# Patient Record
Sex: FEMALE | Race: WHITE | NOT HISPANIC OR LATINO | Employment: OTHER
[De-identification: names, ages, dates, MRNs, and addresses within clinical notes are randomized per-mention and may not be internally consistent; named-entity substitution may affect disease eponyms.]

---

## 2019-11-21 ENCOUNTER — RECORDS - HEALTHEAST (OUTPATIENT)
Dept: ADMINISTRATIVE | Facility: OTHER | Age: 84
End: 2019-11-21

## 2020-01-03 ENCOUNTER — HOSPITAL ENCOUNTER (OUTPATIENT)
Dept: CARDIOLOGY | Facility: HOSPITAL | Age: 85
Discharge: HOME | End: 2020-01-03
Attending: PSYCHIATRY & NEUROLOGY

## 2020-01-03 ENCOUNTER — HOSPITAL ENCOUNTER (OUTPATIENT)
Dept: CT IMAGING | Facility: HOSPITAL | Age: 85
Discharge: HOME | End: 2020-01-03
Attending: PSYCHIATRY & NEUROLOGY

## 2020-01-03 DIAGNOSIS — G45.1 TIA INVOLVING CAROTID ARTERY: ICD-10-CM

## 2020-01-03 LAB
AORTIC ROOT: 2.9 CM
AORTIC VALVE MEAN VELOCITY: 169 CM/S
ASCENDING AORTA: 3.3 CM
AV DIMENSIONLESS INDEX VTI: 0.3
AV MEAN GRADIENT: 14 MMHG
AV PEAK GRADIENT: 23.8 MMHG
AV VALVE AREA: 1.1 CM2
BSA FOR ECHO PROCEDURE: 1.71 M2
CREAT BLD-MCNC: 1.3 MG/DL (ref 0.6–1.1)
CV BLOOD PRESSURE: ABNORMAL MMHG
CV ECHO HEIGHT: 61 IN
CV ECHO WEIGHT: 150 LBS
DOP CALC AO PEAK VEL: 244 CM/S
DOP CALC AO VTI: 59.5 CM
DOP CALC LVOT AREA: 3.46 CM2
DOP CALC LVOT DIAMETER: 2.1 CM
DOP CALC LVOT STROKE VOLUME: 66.8 CM3
DOP CALCLVOT PEAK VEL VTI: 19.3 CM
EJECTION FRACTION: 58 % (ref 55–75)
FRACTIONAL SHORTENING: 31.6 % (ref 28–44)
GFR SERPL CREATININE-BSD FRML MDRD: 38 ML/MIN/1.73M2
INTERVENTRICULAR SEPTUM IN END DIASTOLE: 1.1 CM (ref 0.6–0.9)
IVS/PW RATIO: 1.6
LA AREA 1: 21.5 CM2
LA AREA 2: 17.7 CM2
LEFT ATRIUM LENGTH: 5.27 CM
LEFT ATRIUM SIZE: 3.7 CM
LEFT ATRIUM TO AORTIC ROOT RATIO: 1.28 NO UNITS
LEFT ATRIUM VOLUME INDEX: 35.9 ML/M2
LEFT ATRIUM VOLUME: 61.4 ML
LEFT VENTRICLE DIASTOLIC VOLUME INDEX: 29.2 CM3/M2 (ref 29–61)
LEFT VENTRICLE DIASTOLIC VOLUME: 50 CM3 (ref 46–106)
LEFT VENTRICLE MASS INDEX: 59.1 G/M2
LEFT VENTRICLE SYSTOLIC VOLUME INDEX: 12.3 CM3/M2 (ref 8–24)
LEFT VENTRICLE SYSTOLIC VOLUME: 21 CM3 (ref 14–42)
LEFT VENTRICULAR INTERNAL DIMENSION IN DIASTOLE: 3.8 CM (ref 3.8–5.2)
LEFT VENTRICULAR INTERNAL DIMENSION IN SYSTOLE: 2.6 CM (ref 2.2–3.5)
LEFT VENTRICULAR MASS: 101.1 G
LEFT VENTRICULAR OUTFLOW TRACT MEAN GRADIENT: 2 MMHG
LEFT VENTRICULAR OUTFLOW TRACT MEAN VELOCITY: 57.9 CM/S
LEFT VENTRICULAR POSTERIOR WALL IN END DIASTOLE: 0.7 CM (ref 0.6–0.9)
LV STROKE VOLUME INDEX: 39.1 ML/M2
MV AVERAGE E/E' RATIO: 12.8 CM/S
MV DECELERATION TIME: 338 MS
MV E'TISSUE VEL-LAT: 8.58 CM/S
MV E'TISSUE VEL-MED: 7.21 CM/S
MV LATERAL E/E' RATIO: 11.8
MV MEDIAL E/E' RATIO: 14
MV PEAK E VELOCITY: 101 CM/S
NUC REST DIASTOLIC VOLUME INDEX: 2400 LBS
NUC REST SYSTOLIC VOLUME INDEX: 61 IN
TRICUSPID REGURGITATION PEAK PRESSURE GRADIENT: 24.6 MMHG
TRICUSPID VALVE ANULAR PLANE SYSTOLIC EXCURSION: 1.4 CM
TRICUSPID VALVE PEAK REGURGITANT VELOCITY: 248 CM/S

## 2020-01-03 ASSESSMENT — MIFFLIN-ST. JEOR: SCORE: 1007.78

## 2020-11-05 DIAGNOSIS — E78.5 HYPERLIPIDEMIA, UNSPECIFIED HYPERLIPIDEMIA TYPE: Primary | ICD-10-CM

## 2020-11-05 PROBLEM — I48.91 ATRIAL FIBRILLATION (H): Status: ACTIVE | Noted: 2020-11-05

## 2020-11-05 PROBLEM — I10 HYPERTENSION: Status: ACTIVE | Noted: 2020-11-05

## 2020-11-05 PROBLEM — I25.10 ARTERIOSCLEROSIS OF CORONARY ARTERY: Status: ACTIVE | Noted: 2020-11-05

## 2020-11-05 PROBLEM — N18.30 STAGE 3 CHRONIC KIDNEY DISEASE (H): Status: ACTIVE | Noted: 2020-11-05

## 2020-11-05 PROBLEM — I65.29 INTERNAL CAROTID ARTERY STENOSIS: Status: ACTIVE | Noted: 2020-11-05

## 2020-11-05 PROBLEM — G45.1 TIA INVOLVING CAROTID ARTERY: Status: ACTIVE | Noted: 2020-01-13

## 2020-11-05 RX ORDER — PRAVASTATIN SODIUM 10 MG
10 TABLET ORAL DAILY
COMMUNITY
Start: 2019-11-21 | End: 2020-11-09

## 2020-11-05 NOTE — TELEPHONE ENCOUNTER
Refill request for pravastatin  Medication T'd for review and signature  Lavonne Gould CMA on 11/5/2020 at 3:38 PM

## 2020-11-09 RX ORDER — PRAVASTATIN SODIUM 10 MG
10 TABLET ORAL DAILY
Qty: 90 TABLET | Refills: 0 | Status: SHIPPED | OUTPATIENT
Start: 2020-11-09 | End: 2021-02-07

## 2021-06-04 VITALS — WEIGHT: 150 LBS | BODY MASS INDEX: 28.32 KG/M2 | HEIGHT: 61 IN

## 2022-01-01 ENCOUNTER — HOSPITAL ENCOUNTER (EMERGENCY)
Facility: HOSPITAL | Age: 87
Discharge: HOME | End: 2022-11-06
Attending: EMERGENCY MEDICINE | Admitting: EMERGENCY MEDICINE
Payer: COMMERCIAL

## 2022-01-01 ENCOUNTER — LAB REQUISITION (OUTPATIENT)
Dept: LAB | Facility: CLINIC | Age: 87
End: 2022-01-01
Payer: COMMERCIAL

## 2022-01-01 VITALS
TEMPERATURE: 97.8 F | OXYGEN SATURATION: 97 % | SYSTOLIC BLOOD PRESSURE: 132 MMHG | RESPIRATION RATE: 16 BRPM | HEART RATE: 94 BPM | HEIGHT: 61 IN | DIASTOLIC BLOOD PRESSURE: 79 MMHG | WEIGHT: 147 LBS | BODY MASS INDEX: 27.75 KG/M2

## 2022-01-01 DIAGNOSIS — R79.89 OTHER SPECIFIED ABNORMAL FINDINGS OF BLOOD CHEMISTRY: ICD-10-CM

## 2022-01-01 DIAGNOSIS — S51.811A SKIN TEAR OF FOREARM WITHOUT COMPLICATION, RIGHT, INITIAL ENCOUNTER: ICD-10-CM

## 2022-01-01 PROCEDURE — 99283 EMERGENCY DEPT VISIT LOW MDM: CPT

## 2022-01-01 PROCEDURE — 12002 RPR S/N/AX/GEN/TRNK2.6-7.5CM: CPT

## 2022-01-01 PROCEDURE — 99282 EMERGENCY DEPT VISIT SF MDM: CPT

## 2022-01-01 ASSESSMENT — ACTIVITIES OF DAILY LIVING (ADL): ADLS_ACUITY_SCORE: 35

## 2022-11-06 NOTE — ED PROVIDER NOTES
"EMERGENCY DEPARTMENT NOTE     Name: Payal Bartlett    Age/Sex: 89 year old female   MRN: [id number]   Evaluation Date & Time:  11/6/2022  4:48 PM    PCP:    Jimmy Pringle   ED Provider: Isauro Han D.O.       CHIEF COMPLAINT    Laceration       DIAGNOSIS & DISPOSITION     1. Skin tear of forearm without complication, right, initial encounter      DISPOSITION: Home    At the conclusion of the encounter I discussed the results of all of the tests and the disposition. The questions were answered. The patient or family acknowledged understanding and was agreeable with the care plan.    TOTAL CRITICAL CARE TIME (EXCLUDING PROCEDURES): Not applicable    PROCEDURES:     PROCEDURE: Laceration Repair   INDICATIONS: Laceration   PROCEDURE PROVIDER: Dr Isauro Han   SITE: Right forearm   TYPE/SIZE: simple, clean and no foreign body visualized  3 cm (total length)   FUNCTIONAL ASSESSMENT: Distal sensation, circulation and motor intact   PREPARATION: scrubbing with Normal saline   DEBRIDEMENT: no debridement   CLOSURE:  Superficial layer closed with Dermabond (medical glue)       EMERGENCY DEPARTMENT COURSE/MEDICAL DECISION MAKING   5:19 PM I met with the patient to gather history and to perform my initial exam.  We discussed treatment options and the plan for care while in the Emergency Department.    Payal Bartlett is a 89 year old female with relevant past history of HLD, HTN, atrial fibrillation, CAD, who presents to the emergency department for evaluation of right forearm skin tear.  Triage note reviewed:Pt does take warfarin.     Pt states she was shutting door at home and bumped into the wall and hit right forearm on the beam next to door. Pt denies any other injuries. Last tdap 2011.   Vital signs:/79 (BP Location: Left arm)   Pulse 94   Temp 97.8  F (36.6  C) (Temporal)   Resp 16   Ht 1.549 m (5' 1\")   Wt 66.7 kg (147 lb)   SpO2 97%   BMI 27.78 kg/m    Pertinent physical exam " findings:    Diagnostic studies:  Imaging:  No orders to display     Lab:  Labs Ordered and Resulted from Time of ED Arrival to Time of ED Departure - No data to display  Interventions: Laceration repair as per procedure note  Medical decision making: Patient did not wish to have her tetanus immunization updated today.  Patient will be discharged.  Monitor the wound.  No signs of infection redness swelling or drainage will return to the emergency department.  Medical Decision Making    Supplemental history from: N/A    External Record(s) Reviewed: Outpatient Record    Differential Diagnosis: See MDM charting for differential considered.     I performed an independent interpretation of the: N/A    Discussed with radiology regarding test interpretation: N/A    Discussion of management with another provider: See ED Course and N/A    The following testing was considered but ultimately not selected: None    I considered prescription management with: N/A    The patient's care impacted: None    Consideration of Admission/Observation: N/A - Patient discharged without consideration for admission    Care significantly affected by Social Determinants of Health including: N/A  ED INTERVENTIONS   Medications - No data to display    DISCHARGE MEDICATIONS        Review of your medicines      UNREVIEWED medicines. Ask your doctor about these medicines      Dose / Directions   pravastatin 10 MG tablet  Commonly known as: PRAVACHOL  Used for: Hyperlipidemia, unspecified hyperlipidemia type      Dose: 10 mg  Take 1 tablet (10 mg) by mouth daily  Quantity: 90 tablet  Refills: 0          INFORMATION SOURCE AND LIMITATIONS    History/Exam limitations: patient  Patient information was obtained from: None  Use of : N/A    HISTORY OF PRESENT ILLNESS   Payal Bartlett is a 89 year old female with a relevant past history of HLD, HTN, CAD, who presents to this ED by walk in with family for evaluation of a right arm skin tear. Just  prior to arrival, patient reports she was walking and inadvertently caught her right arm on the door frame, sustaining a skin tear on her proximal right forearm. She is anticoagulated on warfarin. Denies hitting her head or any other trauma/injuries. Last tetanus 2/25/2011. She denies recent illnesses or problems. ROS otherwise negative.    REVIEW OF SYSTEMS:   Constitutional: Negative for fever.   HENT: Negative for URI symptoms or sore throat. Denies head injury.  Eyes: Negative for visual disturbance.   Cardiac: Negative for chest pain, palpitations, near syncope or syncope  Respiratory: Negative for cough and shortness of breath.    Gastrointestinal: Negative for abdominal pain, nausea, vomiting, constipation, diarrhea, rectal bleeding or melena.  Genitourinary: Negative for dysuria, flank pain and hematuria.   Musculoskeletal: Negative for back pain.   Skin: Negative for rash. Positive for skin tear to proximal right forearm.  Neurological: Negative for dizziness, headache, syncope, speech difficulty, unilateral weakness or imbalance with walking.   Hematological: Negative for adenopathy. Does not bruise/bleed easily.   Psychiatric/Behavioral: Negative for confusion.     PATIENT HISTORY   History reviewed. No pertinent past medical history.  Patient Active Problem List   Diagnosis     Arteriosclerosis of coronary artery     Atrial fibrillation (H)     Hyperlipidemia     Hypertension     Internal carotid artery stenosis     Stage 3 chronic kidney disease (H)     TIA involving carotid artery     History reviewed. No pertinent surgical history.  Social Histrory  Smoking:  Alcohol Use:  Allergies   Allergen Reactions     Amoxicillin Rash     Penicillin G Rash       OUTPATIENT MEDICATIONS     New Prescriptions    No medications on file      Vitals:    11/06/22 1643   BP: 132/79   BP Location: Left arm   Pulse: 94   Resp: 16   Temp: 97.8  F (36.6  C)   TempSrc: Temporal   SpO2: 97%   Weight: 66.7 kg (147 lb)  "  Height: 1.549 m (5' 1\")       Physical Exam   Constitutional: Oriented to person, place, and time. Appears well-developed and well-nourished.   HEENT:    Head: Atraumatic.   Neck: Normal range of motion. Neck supple.   Cardiovascular: Normal rate, regular rhythm and normal heart sounds.    Pulmonary/Chest: Normal effort  and breath sounds normal.   Abdominal: Soft. Bowel sounds are normal.   Musculoskeletal: Normal range of motion.   Neurological: Alert and oriented to person, place, and time. Normal strength. No sensory deficit. No cranial nerve deficit.  Skin: Skin is warm and dry. 3 cm long skin tear to proximal right forearm.  Psychiatric: Normal mood and affect. Behavior is normal. Thought content normal.      DIAGNOSTICS    LABORATORY FINDINGS (REVIEWED AND INTERPRETED):  Labs Ordered and Resulted from Time of ED Arrival to Time of ED Departure - No data to display    IMAGING (REVIEWED AND INTERPRETED):  No orders to display       IFlavio, am serving as a scribe to document services personally performed by Isauro Han D.O., based on my observation and the provider s statements to me.    IIsauro D.O., attest that Flavio Mcbride is acting in a scribe capacity, has observed my performance of the services and has documented them in accordance with my direction.    Isauro Han D.O.  EMERGENCY MEDICINE   11/06/22  [facility] EMERGENCY DEPARTMENT  [address] [place] [id number]  [phone]  Dept: [phone]     Isauro Han DO  11/06/22 1759    "

## 2022-11-06 NOTE — DISCHARGE INSTRUCTIONS
The Dermabond will eventually fall off.  You may shower normally.  Do not apply topical antibiotics.  If signs of infection redness swelling drainage return to the emergency department

## 2022-11-06 NOTE — ED TRIAGE NOTES
Pt states she was shutting door at home and bumped into the wall and hit right forearm on the beam next to door. Pt denies any other injuries. Last tdap 2011.

## 2023-01-01 LAB
ANION GAP SERPL CALCULATED.3IONS-SCNC: 16 MMOL/L (ref 7–15)
BUN SERPL-MCNC: 29.9 MG/DL (ref 8–23)
CALCIUM SERPL-MCNC: 9.8 MG/DL (ref 8.2–9.6)
CHLORIDE SERPL-SCNC: 101 MMOL/L (ref 98–107)
CREAT SERPL-MCNC: 1.3 MG/DL (ref 0.51–0.95)
DEPRECATED HCO3 PLAS-SCNC: 20 MMOL/L (ref 22–29)
GFR SERPL CREATININE-BSD FRML MDRD: 37 ML/MIN/1.73M2
GLUCOSE SERPL-MCNC: 94 MG/DL (ref 70–99)
POTASSIUM SERPL-SCNC: 4.3 MMOL/L (ref 3.4–5.3)
SODIUM SERPL-SCNC: 137 MMOL/L (ref 136–145)

## 2023-01-01 PROCEDURE — 80048 BASIC METABOLIC PNL TOTAL CA: CPT | Performed by: NURSE PRACTITIONER

## 2023-01-01 PROCEDURE — P9603 ONE-WAY ALLOW PRORATED MILES: HCPCS | Performed by: NURSE PRACTITIONER

## 2023-01-01 PROCEDURE — 36415 COLL VENOUS BLD VENIPUNCTURE: CPT | Performed by: NURSE PRACTITIONER
